# Patient Record
Sex: FEMALE | Race: OTHER
[De-identification: names, ages, dates, MRNs, and addresses within clinical notes are randomized per-mention and may not be internally consistent; named-entity substitution may affect disease eponyms.]

---
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
## 2022-12-20 NOTE — H&P PST ADULT - ENDOCRINE
Phone call to pt, message relayed. Pt states understanding. Appointment scheduled for this afternoon at 3:30pm. Pt denies further questions or concerns.   negative